# Patient Record
Sex: MALE | Race: OTHER | Employment: FULL TIME | URBAN - METROPOLITAN AREA
[De-identification: names, ages, dates, MRNs, and addresses within clinical notes are randomized per-mention and may not be internally consistent; named-entity substitution may affect disease eponyms.]

---

## 2017-07-24 ENCOUNTER — HOSPITAL ENCOUNTER (EMERGENCY)
Facility: HOSPITAL | Age: 32
Discharge: HOME | End: 2017-07-24
Attending: EMERGENCY MEDICINE
Payer: OTHER MISCELLANEOUS

## 2017-07-24 VITALS
BODY MASS INDEX: 29.16 KG/M2 | DIASTOLIC BLOOD PRESSURE: 88 MMHG | HEART RATE: 81 BPM | WEIGHT: 220 LBS | RESPIRATION RATE: 18 BRPM | TEMPERATURE: 97 F | SYSTOLIC BLOOD PRESSURE: 140 MMHG | OXYGEN SATURATION: 100 % | HEIGHT: 73 IN

## 2017-07-24 DIAGNOSIS — S71.112A LACERATION OF LEFT THIGH, INITIAL ENCOUNTER: Primary | ICD-10-CM

## 2017-07-24 PROCEDURE — 12001 RPR S/N/AX/GEN/TRNK 2.5CM/<: CPT

## 2017-07-24 PROCEDURE — 99283 EMERGENCY DEPT VISIT LOW MDM: CPT

## 2017-07-24 RX ORDER — CEPHALEXIN 500 MG/1
500 CAPSULE ORAL 4 TIMES DAILY
Qty: 20 CAPSULE | Refills: 0 | Status: SHIPPED | OUTPATIENT
Start: 2017-07-24 | End: 2017-07-29

## 2017-07-24 NOTE — ED INITIAL ASSESSMENT (HPI)
Pt was brought by EMS. 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 Pt was cut on tip of . Lt inner thigh has approx 1 inch laceration.

## 2017-07-24 NOTE — ED NOTES
Pt has approx 1 inch laceration on Lt inner thigh. 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 Wound was cleaned and redressed. Bleeding controlled.

## 2017-07-24 NOTE — ED PROVIDER NOTES
Patient Seen in: [facility] AND [facility]    History   Patient presents with:  Laceration Abrasion (integumentary)    Stated Complaint: Leg laceration/work injury    HPI    78-year-old male who presents with laceration to his left thigh.   H Labs Reviewed - No data to display    ============================================================  ED Course  ------------------------------------------------------------   Laceration repair:    Verbal consent was obtained from the patient.   The 2 cm lace

## 2017-07-26 ENCOUNTER — APPOINTMENT (OUTPATIENT)
Dept: OTHER | Facility: HOSPITAL | Age: 32
End: 2017-07-26
Attending: EMERGENCY MEDICINE

## 2017-08-07 ENCOUNTER — APPOINTMENT (OUTPATIENT)
Dept: OTHER | Facility: HOSPITAL | Age: 32
End: 2017-08-07
Attending: FAMILY MEDICINE

## 2025-07-30 NOTE — ED NOTES
Health Maintenance       DTaP/Tdap/Td Vaccine (2 - Td or Tdap)  Overdue since 6/29/2024    COVID-19 Vaccine (3 - 2024-25 season)  Overdue since 9/1/2024    Hepatitis B Vaccine (1 of 3 - 19+ 3-dose series)  Never done    Pneumococcal Vaccine 50+ (1 of 2 - PCV)  Never done    Colorectal Cancer Screening  Never done    Shingles Vaccine (1 of 2)  Never done    Breast Cancer Screening (Every 2 Years)  Order placed this encounter           Following review of the above:  Patient is not proceeding with: COVID-19 and Dtap/Tdap/Td    Note: Refer to final orders and clinician documentation.       Per Dr. Chip Cross, pt states had tetanus shot last year in [facility].

## (undated) NOTE — LETTER
July 24, 2017    Patient: Marie Olmedo   Date of Visit: 7/24/2017       To Whom It May Concern:    Marie Olmedo was seen and treated in our emergency department on 7/24/2017. He should not return to work until 7/26/17.     If you have any questions or con

## (undated) NOTE — ED AVS SNAPSHOT
[facility]  [address]   [address] [place] [address]  Phone:  [phone]  Fax:  [phone]          Matthew Covington   MRN: [id number]    Department:  [facility]   Date of Visit:  7/24/2017 visiting [email or web address]    IF THERE IS ANY CHANGE OR WORSENING OF YOUR CONDITION, CALL YOUR PRIMARY CARE PHYSICIAN AT ONCE OR RETURN IMMEDIATELY TO THE EMERGENCY DEPARTMENT.     If you have been prescribed any medication(s), please fill your prescription